# Patient Record
Sex: FEMALE | Race: WHITE | Employment: FULL TIME | URBAN - METROPOLITAN AREA
[De-identification: names, ages, dates, MRNs, and addresses within clinical notes are randomized per-mention and may not be internally consistent; named-entity substitution may affect disease eponyms.]

---

## 2017-03-18 PROCEDURE — 82085 ASSAY OF ALDOLASE: CPT | Performed by: FAMILY MEDICINE

## 2017-03-18 PROCEDURE — 82671 ASSAY OF ESTROGENS: CPT | Performed by: FAMILY MEDICINE

## 2017-03-18 PROCEDURE — 83001 ASSAY OF GONADOTROPIN (FSH): CPT | Performed by: FAMILY MEDICINE

## 2017-03-18 PROCEDURE — 83002 ASSAY OF GONADOTROPIN (LH): CPT | Performed by: FAMILY MEDICINE

## 2018-01-24 PROCEDURE — 80061 LIPID PANEL: CPT | Performed by: FAMILY MEDICINE

## 2018-04-18 PROCEDURE — 87086 URINE CULTURE/COLONY COUNT: CPT | Performed by: OBSTETRICS & GYNECOLOGY

## 2018-08-16 ENCOUNTER — APPOINTMENT (OUTPATIENT)
Dept: CT IMAGING | Age: 53
End: 2018-08-16
Attending: FAMILY MEDICINE
Payer: COMMERCIAL

## 2018-08-16 ENCOUNTER — HOSPITAL ENCOUNTER (OUTPATIENT)
Age: 53
Discharge: HOME | End: 2018-08-16
Attending: FAMILY MEDICINE
Payer: COMMERCIAL

## 2018-08-16 ENCOUNTER — APPOINTMENT (OUTPATIENT)
Dept: GENERAL RADIOLOGY | Age: 53
End: 2018-08-16
Attending: FAMILY MEDICINE
Payer: COMMERCIAL

## 2018-08-16 VITALS
TEMPERATURE: 98 F | DIASTOLIC BLOOD PRESSURE: 84 MMHG | OXYGEN SATURATION: 99 % | SYSTOLIC BLOOD PRESSURE: 118 MMHG | HEART RATE: 80 BPM | RESPIRATION RATE: 17 BRPM

## 2018-08-16 DIAGNOSIS — R10.84 ABDOMINAL PAIN, GENERALIZED: Primary | ICD-10-CM

## 2018-08-16 DIAGNOSIS — K59.00 CONSTIPATION, UNSPECIFIED CONSTIPATION TYPE: ICD-10-CM

## 2018-08-16 LAB
#LYMPHOCYTE IC: 1.7 X10ˆ3/UL (ref 0.9–3.2)
#MXD IC: 0.4 X10ˆ3/UL (ref 0.1–1)
#NEUTROPHIL IC: 3.3 X10ˆ3/UL (ref 1.3–6.7)
CREAT SERPL-MCNC: 0.9 MG/DL (ref 0.55–1.02)
GLUCOSE BLD-MCNC: 100 MG/DL (ref 70–99)
HCT IC: 41.9 % (ref 37–54)
HGB IC: 13.4 G/DL (ref 11.7–16)
ISTAT BUN: 17 MG/DL (ref 8–20)
ISTAT CHLORIDE: 104 MMOL/L (ref 101–111)
ISTAT HEMATOCRIT: 41 % (ref 34–50)
ISTAT IONIZED CALCIUM: 1.14 MMOL/L
ISTAT POTASSIUM: 4.1 MMOL/L (ref 3.6–5.1)
ISTAT SODIUM: 140 MMOL/L (ref 136–144)
LYMPHOCYTES NFR BLD AUTO: 32.3 %
MCH IC: 27.5 PG (ref 27–33.2)
MCHC IC: 32 G/DL (ref 31–37)
MCV IC: 86 FL (ref 81–100)
MIXED CELL %: 8.1 %
NEUTROPHILS NFR BLD AUTO: 59.6 %
PLT IC: 302 X10ˆ3/UL (ref 150–450)
POCT BILIRUBIN URINE: NEGATIVE
POCT GLUCOSE URINE: NEGATIVE MG/DL
POCT KETONE URINE: NEGATIVE MG/DL
POCT LEUKOCYTE ESTERASE URINE: NEGATIVE
POCT NITRITE URINE: NEGATIVE
POCT PH URINE: 7.5 (ref 5–8)
POCT SPECIFIC GRAVITY URINE: 1.01
POCT URINE CLARITY: CLEAR
POCT URINE COLOR: YELLOW
POCT UROBILINOGEN URINE: 0.2 MG/DL
RBC IC: 4.87 X10ˆ6/UL (ref 3.8–5.1)
WBC IC: 5.4 X10ˆ3/UL (ref 4–13)

## 2018-08-16 PROCEDURE — 74176 CT ABD & PELVIS W/O CONTRAST: CPT | Performed by: FAMILY MEDICINE

## 2018-08-16 PROCEDURE — 85025 COMPLETE CBC W/AUTO DIFF WBC: CPT | Performed by: FAMILY MEDICINE

## 2018-08-16 PROCEDURE — 99204 OFFICE O/P NEW MOD 45 MIN: CPT

## 2018-08-16 PROCEDURE — 99215 OFFICE O/P EST HI 40 MIN: CPT

## 2018-08-16 PROCEDURE — 80047 BASIC METABLC PNL IONIZED CA: CPT

## 2018-08-16 PROCEDURE — 74018 RADEX ABDOMEN 1 VIEW: CPT | Performed by: FAMILY MEDICINE

## 2018-08-16 PROCEDURE — 81002 URINALYSIS NONAUTO W/O SCOPE: CPT | Performed by: FAMILY MEDICINE

## 2018-08-16 PROCEDURE — 36415 COLL VENOUS BLD VENIPUNCTURE: CPT

## 2018-08-16 NOTE — ED INITIAL ASSESSMENT (HPI)
Pt has had rt sided back/flank to abdomen pain for 1 week with hot and cold flashes but no fever,   Decrease in appetite, and is not having nausea vomiting or diarrhea, may be a little constipated

## 2018-08-16 NOTE — ED PROVIDER NOTES
Patient Seen in: [facility]    History   Patient presents with:  Abdomen/Flank Pain (GI/)    Stated Complaint: abdominal pain 1 wk    HPI    39-year-old female presents to the office with a week history of right-sided flank pa normal, EAC's normal.  NOSE: Turbinates normal, no bleeding noted. PHARYNX:  No eythema or exudates, airway patent, uvula midline  NECK:  No cervical lymphadenopathy. No thyromegaly,  HEART: Regular rate and rhythm, no S3, S4 or murmur noted.   LUNGS: Debra lesion. BILIARY:  No visible dilatation or calcification. PANCREAS:  No lesion, fluid collection, ductal dilatation, or atrophy. SPLEEN:  No enlargement or focal lesion. AORTA/VASCULAR:  No aneurysm. RETROPERITONEUM:  No mass or adenopathy.   BOWEL/MES Dietary measures are reviewed. She may take milk of magnesia today and then switch to MiraLAX. She is instructed to go the emergency room for worsening pain, blood in the urine or stool persistent vomiting.   She is to follow-up with her primary doctor i

## 2018-09-14 PROCEDURE — 88175 CYTOPATH C/V AUTO FLUID REDO: CPT | Performed by: OBSTETRICS & GYNECOLOGY

## 2018-09-14 PROCEDURE — 87624 HPV HI-RISK TYP POOLED RSLT: CPT | Performed by: OBSTETRICS & GYNECOLOGY
